# Patient Record
Sex: FEMALE | Race: ASIAN | Employment: FULL TIME | URBAN - METROPOLITAN AREA
[De-identification: names, ages, dates, MRNs, and addresses within clinical notes are randomized per-mention and may not be internally consistent; named-entity substitution may affect disease eponyms.]

---

## 2018-03-18 ENCOUNTER — HOSPITAL ENCOUNTER (OUTPATIENT)
Age: 53
Setting detail: OBSERVATION
Discharge: LEFT BEFORE DISCHARGE | End: 2018-03-19
Attending: EMERGENCY MEDICINE | Admitting: HOSPITALIST
Payer: COMMERCIAL

## 2018-03-18 ENCOUNTER — APPOINTMENT (OUTPATIENT)
Dept: CT IMAGING | Age: 53
End: 2018-03-18
Attending: EMERGENCY MEDICINE
Payer: COMMERCIAL

## 2018-03-18 ENCOUNTER — APPOINTMENT (OUTPATIENT)
Dept: GENERAL RADIOLOGY | Age: 53
End: 2018-03-18
Attending: EMERGENCY MEDICINE
Payer: COMMERCIAL

## 2018-03-18 DIAGNOSIS — F41.1 ANXIETY STATE: ICD-10-CM

## 2018-03-18 DIAGNOSIS — R06.02 SOB (SHORTNESS OF BREATH): Primary | ICD-10-CM

## 2018-03-18 PROBLEM — F06.8: Status: ACTIVE | Noted: 2018-03-18

## 2018-03-18 PROBLEM — R22.1 NECK MASS: Status: ACTIVE | Noted: 2018-03-18

## 2018-03-18 PROBLEM — G93.40 ENCEPHALOPATHY ACUTE: Status: ACTIVE | Noted: 2018-03-18

## 2018-03-18 PROBLEM — F41.9 ANXIETY: Status: ACTIVE | Noted: 2018-03-18

## 2018-03-18 PROBLEM — E87.6 HYPOKALEMIA: Status: ACTIVE | Noted: 2018-03-18

## 2018-03-18 LAB
ALBUMIN SERPL-MCNC: 4.3 G/DL (ref 3.5–5)
ALBUMIN/GLOB SERPL: 1.2 {RATIO} (ref 1.2–3.5)
ALP SERPL-CCNC: 61 U/L (ref 50–136)
ALT SERPL-CCNC: 22 U/L (ref 12–65)
ANION GAP SERPL CALC-SCNC: 15 MMOL/L (ref 7–16)
AST SERPL-CCNC: 20 U/L (ref 15–37)
BASOPHILS # BLD: 0 K/UL (ref 0–0.2)
BASOPHILS NFR BLD: 0 % (ref 0–2)
BILIRUB SERPL-MCNC: 1 MG/DL (ref 0.2–1.1)
BUN SERPL-MCNC: 5 MG/DL (ref 6–23)
CALCIUM SERPL-MCNC: 8.9 MG/DL (ref 8.3–10.4)
CHLORIDE SERPL-SCNC: 102 MMOL/L (ref 98–107)
CO2 SERPL-SCNC: 24 MMOL/L (ref 21–32)
CREAT SERPL-MCNC: 0.73 MG/DL (ref 0.6–1)
D DIMER PPP FEU-MCNC: 0.73 UG/ML(FEU)
DIFFERENTIAL METHOD BLD: ABNORMAL
EOSINOPHIL # BLD: 0 K/UL (ref 0–0.8)
EOSINOPHIL NFR BLD: 1 % (ref 0.5–7.8)
ERYTHROCYTE [DISTWIDTH] IN BLOOD BY AUTOMATED COUNT: 13.1 % (ref 11.9–14.6)
GLOBULIN SER CALC-MCNC: 3.7 G/DL (ref 2.3–3.5)
GLUCOSE SERPL-MCNC: 126 MG/DL (ref 65–100)
HCT VFR BLD AUTO: 41.9 % (ref 35.8–46.3)
HGB BLD-MCNC: 13.7 G/DL (ref 11.7–15.4)
IMM GRANULOCYTES # BLD: 0 K/UL (ref 0–0.5)
IMM GRANULOCYTES NFR BLD AUTO: 0 % (ref 0–5)
LYMPHOCYTES # BLD: 2.3 K/UL (ref 0.5–4.6)
LYMPHOCYTES NFR BLD: 26 % (ref 13–44)
MCH RBC QN AUTO: 27.1 PG (ref 26.1–32.9)
MCHC RBC AUTO-ENTMCNC: 32.7 G/DL (ref 31.4–35)
MCV RBC AUTO: 83 FL (ref 79.6–97.8)
MONOCYTES # BLD: 0.6 K/UL (ref 0.1–1.3)
MONOCYTES NFR BLD: 7 % (ref 4–12)
NEUTS SEG # BLD: 5.7 K/UL (ref 1.7–8.2)
NEUTS SEG NFR BLD: 66 % (ref 43–78)
PLATELET # BLD AUTO: 324 K/UL (ref 150–450)
PMV BLD AUTO: 10.1 FL (ref 10.8–14.1)
POTASSIUM SERPL-SCNC: 2.9 MMOL/L (ref 3.5–5.1)
PROT SERPL-MCNC: 8 G/DL (ref 6.3–8.2)
RBC # BLD AUTO: 5.05 M/UL (ref 4.05–5.25)
SODIUM SERPL-SCNC: 141 MMOL/L (ref 136–145)
TROPONIN I SERPL-MCNC: <0.04 NG/ML (ref 0.02–0.05)
TSH SERPL DL<=0.005 MIU/L-ACNC: 0.35 UIU/ML (ref 0.36–3.74)
WBC # BLD AUTO: 8.6 K/UL (ref 4.3–11.1)

## 2018-03-18 PROCEDURE — 71046 X-RAY EXAM CHEST 2 VIEWS: CPT

## 2018-03-18 PROCEDURE — 74011636320 HC RX REV CODE- 636/320: Performed by: EMERGENCY MEDICINE

## 2018-03-18 PROCEDURE — 99218 HC RM OBSERVATION: CPT

## 2018-03-18 PROCEDURE — 84484 ASSAY OF TROPONIN QUANT: CPT | Performed by: EMERGENCY MEDICINE

## 2018-03-18 PROCEDURE — 84439 ASSAY OF FREE THYROXINE: CPT | Performed by: EMERGENCY MEDICINE

## 2018-03-18 PROCEDURE — 74011250637 HC RX REV CODE- 250/637: Performed by: EMERGENCY MEDICINE

## 2018-03-18 PROCEDURE — 84443 ASSAY THYROID STIM HORMONE: CPT | Performed by: EMERGENCY MEDICINE

## 2018-03-18 PROCEDURE — 93005 ELECTROCARDIOGRAM TRACING: CPT | Performed by: EMERGENCY MEDICINE

## 2018-03-18 PROCEDURE — 70491 CT SOFT TISSUE NECK W/DYE: CPT

## 2018-03-18 PROCEDURE — 71260 CT THORAX DX C+: CPT

## 2018-03-18 PROCEDURE — 85379 FIBRIN DEGRADATION QUANT: CPT | Performed by: EMERGENCY MEDICINE

## 2018-03-18 PROCEDURE — 85025 COMPLETE CBC W/AUTO DIFF WBC: CPT | Performed by: EMERGENCY MEDICINE

## 2018-03-18 PROCEDURE — 99285 EMERGENCY DEPT VISIT HI MDM: CPT | Performed by: EMERGENCY MEDICINE

## 2018-03-18 PROCEDURE — 80053 COMPREHEN METABOLIC PANEL: CPT | Performed by: EMERGENCY MEDICINE

## 2018-03-18 PROCEDURE — 74011000258 HC RX REV CODE- 258: Performed by: EMERGENCY MEDICINE

## 2018-03-18 RX ORDER — POTASSIUM CHLORIDE 20 MEQ/1
40 TABLET, EXTENDED RELEASE ORAL
Status: COMPLETED | OUTPATIENT
Start: 2018-03-18 | End: 2018-03-18

## 2018-03-18 RX ORDER — SODIUM CHLORIDE 0.9 % (FLUSH) 0.9 %
10 SYRINGE (ML) INJECTION
Status: COMPLETED | OUTPATIENT
Start: 2018-03-18 | End: 2018-03-18

## 2018-03-18 RX ADMIN — SODIUM CHLORIDE 100 ML: 900 INJECTION, SOLUTION INTRAVENOUS at 20:23

## 2018-03-18 RX ADMIN — IOPAMIDOL 100 ML: 755 INJECTION, SOLUTION INTRAVENOUS at 20:23

## 2018-03-18 RX ADMIN — Medication 10 ML: at 20:23

## 2018-03-18 RX ADMIN — POTASSIUM CHLORIDE 40 MEQ: 20 TABLET, EXTENDED RELEASE ORAL at 18:34

## 2018-03-18 NOTE — ED NOTES
Pt states following swallowing KDur and orange juice, the feeling of something being stuck in her throat has disappeared.

## 2018-03-18 NOTE — ED TRIAGE NOTES
Patient at this time has now calmed down and states that she is feeling better than when she initially arrived. Patient denies any shortness of breath or pain at this time. Patient's only complaint at this time is \"feels like something in my throat\" for a few days however has not had difficulty eating or drinking.

## 2018-03-18 NOTE — ED PROVIDER NOTES
HPI Comments: Patient is a 49 yo female who presents with \"I felt like I was going to die\". States she has had a feeling of something in her throat for the past month however today became extremely anxious and felt like she was going to die. She denies any complaints at this time other than feeling in her throat, however denies chest pain, no SOB, no abdominal pain, no nausea or vomiting, no further concerns. While in CT (3 hours into ED stay)  pulls me aside and states she has been having severe hallucinations/delusions that people are chasing her and that there is a transmitter in her neck and she has been very difficult to control at times due to severe anxiety about these thoughts. Patient is a 50 y.o. female presenting with hyperventilation and shortness of breath. The history is provided by the patient. No  was used. Hyperventilating   Pertinent negatives include no fever, no headaches, no rhinorrhea, no sore throat, no neck pain, no cough, no chest pain, no vomiting, no abdominal pain, no rash and no leg swelling. Shortness of Breath   Pertinent negatives include no fever, no headaches, no rhinorrhea, no sore throat, no neck pain, no cough, no chest pain, no vomiting, no abdominal pain, no rash and no leg swelling.         Past Medical History:   Diagnosis Date    Calculus of kidney     hx    Endometrial carcinoma ([facility] [address] 12/11/14    hysterectomy     Endometriosis 12/8/2014    Fibrocystic breast     GERD (gastroesophageal reflux disease)     Nausea & vomiting     Unspecified adverse effect of anesthesia     delayed awaking       Past Surgical History:   Procedure Laterality Date    HX ACL RECONSTRUCTION Left 2004    HX APPENDECTOMY      HX HYSTERECTOMY      HX LITHOTRIPSY      HX OTHER SURGICAL      parathyroid    HX PARATHYROIDECTOMY  10/2013         Family History:   Problem Relation Age of Onset    Diabetes Mother     High Cholesterol Mother     Asthma Father     Breast Cancer Neg Hx        Social History     Social History    Marital status:      Spouse name: N/A    Number of children: N/A    Years of education: N/A     Occupational History    Not on file. Social History Main Topics    Smoking status: Never Smoker    Smokeless tobacco: Never Used    Alcohol use Yes      Comment: occas    Drug use: No    Sexual activity: Yes     Partners: Male     Birth control/ protection: Condom     Other Topics Concern    Not on file     Social History Narrative         ALLERGIES: Review of patient's allergies indicates no known allergies. Review of Systems   Constitutional: Negative for chills and fever. HENT: Positive for trouble swallowing. Negative for rhinorrhea and sore throat. Eyes: Negative for visual disturbance. Respiratory: Positive for shortness of breath. Negative for cough. Cardiovascular: Negative for chest pain and leg swelling. Gastrointestinal: Negative for abdominal pain, diarrhea, nausea and vomiting. Genitourinary: Negative for dysuria. Musculoskeletal: Negative for back pain and neck pain. Skin: Negative for rash. Neurological: Negative for weakness and headaches. Psychiatric/Behavioral: The patient is nervous/anxious. Vitals:    03/18/18 1721   Pulse: (!) 104   Resp: 20   Temp: 98.5 °F (36.9 °C)   SpO2: 97%   Weight: 45.4 kg (100 lb)   Height: 5' (1.524 m)            Physical Exam   Constitutional: She is oriented to person, place, and time. She appears well-developed and well-nourished. HENT:   Head: Normocephalic. Right Ear: External ear normal.   Left Ear: External ear normal.   Eyes: Conjunctivae and EOM are normal. Pupils are equal, round, and reactive to light. Neck: Normal range of motion. Neck supple. No tracheal deviation present. Cardiovascular: Normal rate, regular rhythm, normal heart sounds and intact distal pulses. No murmur heard.   Pulmonary/Chest: Effort normal and breath sounds normal. No respiratory distress. Abdominal: Soft. There is no tenderness. Musculoskeletal: Normal range of motion. Neurological: She is alert and oriented to person, place, and time. No cranial nerve deficit. Cn 2-12 fully intact, strength and sensation 5/5 in all extremities, negative pronator drift, ambulates without difficulty, no focal deficits appreciated. Skin: No rash noted. Nursing note and vitals reviewed. MDM  Number of Diagnoses or Management Options  Anxiety state: new and requires workup  SOB (shortness of breath): new and requires workup     Amount and/or Complexity of Data Reviewed  Clinical lab tests: ordered and reviewed  Tests in the radiology section of CPT®: ordered and reviewed  Tests in the medicine section of CPT®: ordered and reviewed  Review and summarize past medical records: yes    Risk of Complications, Morbidity, and/or Mortality  Presenting problems: high  Diagnostic procedures: high  Management options: high    Patient Progress  Patient progress: stable        ED Course       Procedures    Recent Results (from the past 12 hour(s))   CBC WITH AUTOMATED DIFF    Collection Time: 03/18/18  5:40 PM   Result Value Ref Range    WBC 8.6 4.3 - 11.1 K/uL    RBC 5.05 4.05 - 5.25 M/uL    HGB 13.7 11.7 - 15.4 g/dL    HCT 41.9 35.8 - 46.3 %    MCV 83.0 79.6 - 97.8 FL    MCH 27.1 26.1 - 32.9 PG    MCHC 32.7 31.4 - 35.0 g/dL    RDW 13.1 11.9 - 14.6 %    PLATELET 410 070 - 443 K/uL    MPV 10.1 (L) 10.8 - 14.1 FL    DF AUTOMATED      NEUTROPHILS 66 43 - 78 %    LYMPHOCYTES 26 13 - 44 %    MONOCYTES 7 4.0 - 12.0 %    EOSINOPHILS 1 0.5 - 7.8 %    BASOPHILS 0 0.0 - 2.0 %    IMMATURE GRANULOCYTES 0 0.0 - 5.0 %    ABS. NEUTROPHILS 5.7 1.7 - 8.2 K/UL    ABS. LYMPHOCYTES 2.3 0.5 - 4.6 K/UL    ABS. MONOCYTES 0.6 0.1 - 1.3 K/UL    ABS. EOSINOPHILS 0.0 0.0 - 0.8 K/UL    ABS. BASOPHILS 0.0 0.0 - 0.2 K/UL    ABS. IMM.  GRANS. 0.0 0.0 - 0.5 K/UL   METABOLIC PANEL, COMPREHENSIVE Collection Time: 03/18/18  5:40 PM   Result Value Ref Range    Sodium 141 136 - 145 mmol/L    Potassium 2.9 (LL) 3.5 - 5.1 mmol/L    Chloride 102 98 - 107 mmol/L    CO2 24 21 - 32 mmol/L    Anion gap 15 7 - 16 mmol/L    Glucose 126 (H) 65 - 100 mg/dL    BUN 5 (L) 6 - 23 MG/DL    Creatinine 0.73 0.6 - 1.0 MG/DL    GFR est AA >60 >60 ml/min/1.73m2    GFR est non-AA >60 >60 ml/min/1.73m2    Calcium 8.9 8.3 - 10.4 MG/DL    Bilirubin, total 1.0 0.2 - 1.1 MG/DL    ALT (SGPT) 22 12 - 65 U/L    AST (SGOT) 20 15 - 37 U/L    Alk. phosphatase 61 50 - 136 U/L    Protein, total 8.0 6.3 - 8.2 g/dL    Albumin 4.3 3.5 - 5.0 g/dL    Globulin 3.7 (H) 2.3 - 3.5 g/dL    A-G Ratio 1.2 1.2 - 3.5     D DIMER    Collection Time: 03/18/18  5:40 PM   Result Value Ref Range    D DIMER 0.73 (HH) <0.56 ug/ml(FEU)   TROPONIN I    Collection Time: 03/18/18  5:40 PM   Result Value Ref Range    Troponin-I, Qt. <0.04 0.02 - 0.05 NG/ML   TSH 3RD GENERATION    Collection Time: 03/18/18  5:40 PM   Result Value Ref Range    TSH 0.355 (L) 0.358 - 3.740 uIU/mL          49 yo female with mass in throat and hallucinations/delusions:    Discussed CT with Dr. Simeon Pool who will scope patient for further evaluation either as outpatient or inpatient. Patient needs imaging of brain for these hallucinations to rule out mass vs. Further acute etiology however received contrast for CT neck so unable to perform at this time and feel MRI likely more helpful. Given entire clinical picture will admit for MRI and endoscopy for further workup.

## 2018-03-18 NOTE — ED NOTES
Lab called and said D-Dimer for this pt would be a while as they are having trouble with their machine.

## 2018-03-18 NOTE — ED NOTES
Patient arrived in ED with  at this time hyperventilating and screaming that she is going to die and that he needs to take care of her children. Patient noted with respiratory rate around 50-60 and would not talked to staff however would then start yelling again at . Patient was then taken and had 12 lead EKG performed and with verbal encouragement has slowed down respiratory rate and talking calmly with this RN. Patient complaining of shortness of breath and \"my body is spreading\". Patient moved back into main area of ER and advised her  could only be with her if she remained calmed, kept her breathing controlled and did not yell at him within ED.

## 2018-03-19 VITALS
TEMPERATURE: 97.6 F | HEART RATE: 84 BPM | RESPIRATION RATE: 20 BRPM | HEIGHT: 60 IN | OXYGEN SATURATION: 96 % | SYSTOLIC BLOOD PRESSURE: 134 MMHG | DIASTOLIC BLOOD PRESSURE: 81 MMHG | BODY MASS INDEX: 19.63 KG/M2 | WEIGHT: 100 LBS

## 2018-03-19 LAB
AMPHET UR QL SCN: NEGATIVE
ANION GAP SERPL CALC-SCNC: 17 MMOL/L (ref 7–16)
ATRIAL RATE: 109 BPM
BARBITURATES UR QL SCN: NEGATIVE
BENZODIAZ UR QL: NEGATIVE
BUN SERPL-MCNC: 6 MG/DL (ref 6–23)
CALCIUM SERPL-MCNC: 8.8 MG/DL (ref 8.3–10.4)
CALCIUM SERPL-MCNC: 8.8 MG/DL (ref 8.3–10.4)
CALCULATED P AXIS, ECG09: 68 DEGREES
CALCULATED R AXIS, ECG10: 65 DEGREES
CALCULATED T AXIS, ECG11: 45 DEGREES
CANNABINOIDS UR QL SCN: NEGATIVE
CHLORIDE SERPL-SCNC: 101 MMOL/L (ref 98–107)
CO2 SERPL-SCNC: 23 MMOL/L (ref 21–32)
COCAINE UR QL SCN: NEGATIVE
CREAT SERPL-MCNC: 0.66 MG/DL (ref 0.6–1)
DIAGNOSIS, 93000: NORMAL
FOLATE SERPL-MCNC: 19.9 NG/ML (ref 3.1–17.5)
GLUCOSE SERPL-MCNC: 148 MG/DL (ref 65–100)
MAGNESIUM SERPL-MCNC: 2.2 MG/DL (ref 1.8–2.4)
METHADONE UR QL: NEGATIVE
OPIATES UR QL: NEGATIVE
P-R INTERVAL, ECG05: 120 MS
PCP UR QL: NEGATIVE
PHOSPHATE SERPL-MCNC: 3.2 MG/DL (ref 2.5–4.5)
POTASSIUM SERPL-SCNC: 3.8 MMOL/L (ref 3.5–5.1)
PTH-INTACT SERPL-MCNC: 97.7 PG/ML (ref 18.5–88)
Q-T INTERVAL, ECG07: 490 MS
QRS DURATION, ECG06: 80 MS
QTC CALCULATION (BEZET), ECG08: 659 MS
SODIUM SERPL-SCNC: 141 MMOL/L (ref 136–145)
T4 FREE SERPL-MCNC: 1.4 NG/DL (ref 0.78–1.46)
VENTRICULAR RATE, ECG03: 109 BPM
VIT B12 SERPL-MCNC: 331 PG/ML (ref 193–986)

## 2018-03-19 PROCEDURE — 80307 DRUG TEST PRSMV CHEM ANLYZR: CPT | Performed by: HOSPITALIST

## 2018-03-19 PROCEDURE — 96360 HYDRATION IV INFUSION INIT: CPT

## 2018-03-19 PROCEDURE — 82607 VITAMIN B-12: CPT | Performed by: HOSPITALIST

## 2018-03-19 PROCEDURE — 84100 ASSAY OF PHOSPHORUS: CPT | Performed by: HOSPITALIST

## 2018-03-19 PROCEDURE — 83970 ASSAY OF PARATHORMONE: CPT | Performed by: HOSPITALIST

## 2018-03-19 PROCEDURE — 74011250636 HC RX REV CODE- 250/636: Performed by: HOSPITALIST

## 2018-03-19 PROCEDURE — 80048 BASIC METABOLIC PNL TOTAL CA: CPT | Performed by: HOSPITALIST

## 2018-03-19 PROCEDURE — 83735 ASSAY OF MAGNESIUM: CPT | Performed by: HOSPITALIST

## 2018-03-19 PROCEDURE — 96361 HYDRATE IV INFUSION ADD-ON: CPT

## 2018-03-19 PROCEDURE — 99218 HC RM OBSERVATION: CPT

## 2018-03-19 PROCEDURE — 82746 ASSAY OF FOLIC ACID SERUM: CPT | Performed by: HOSPITALIST

## 2018-03-19 RX ORDER — SODIUM CHLORIDE 0.9 % (FLUSH) 0.9 %
5-10 SYRINGE (ML) INJECTION AS NEEDED
Status: DISCONTINUED | OUTPATIENT
Start: 2018-03-19 | End: 2018-03-19 | Stop reason: HOSPADM

## 2018-03-19 RX ORDER — ONDANSETRON 2 MG/ML
4 INJECTION INTRAMUSCULAR; INTRAVENOUS
Status: DISCONTINUED | OUTPATIENT
Start: 2018-03-19 | End: 2018-03-19 | Stop reason: HOSPADM

## 2018-03-19 RX ORDER — LORAZEPAM 2 MG/ML
1 INJECTION INTRAMUSCULAR
Status: DISCONTINUED | OUTPATIENT
Start: 2018-03-18 | End: 2018-03-19 | Stop reason: HOSPADM

## 2018-03-19 RX ORDER — SODIUM CHLORIDE 0.9 % (FLUSH) 0.9 %
5-10 SYRINGE (ML) INJECTION EVERY 8 HOURS
Status: DISCONTINUED | OUTPATIENT
Start: 2018-03-19 | End: 2018-03-19 | Stop reason: HOSPADM

## 2018-03-19 RX ORDER — ACETAMINOPHEN 325 MG/1
650 TABLET ORAL
Status: DISCONTINUED | OUTPATIENT
Start: 2018-03-19 | End: 2018-03-19 | Stop reason: HOSPADM

## 2018-03-19 RX ORDER — SODIUM CHLORIDE 9 MG/ML
125 INJECTION, SOLUTION INTRAVENOUS CONTINUOUS
Status: DISCONTINUED | OUTPATIENT
Start: 2018-03-19 | End: 2018-03-19 | Stop reason: HOSPADM

## 2018-03-19 RX ADMIN — SODIUM CHLORIDE 125 ML/HR: 900 INJECTION, SOLUTION INTRAVENOUS at 00:30

## 2018-03-19 NOTE — DISCHARGE SUMMARY
Hospitalist Discharge Summary     Patient ID:  René Hill  [id number]  50 y.o.  1965  Admit date: 3/18/2018  5:50 PM  Discharge date and time: 3/19/2018  Attending: Izabel Muñoz MD  PCP:  Ger Reddy MD  Treatment Team: Attending Provider: Izabel Muñoz MD    Principal Diagnosis Acute psychosis associated with endocrine, metabolic, or cerebrovascular disorder   Principal Problem:    Acute psychosis associated with endocrine, metabolic, or cerebrovascular disorder (3/18/2018)    Active Problems:    Neck mass (3/18/2018)      Encephalopathy acute (3/18/2018)      Hypokalemia (3/18/2018)      Anxiety (3/18/2018)             Hospital Course:  Please refer to the admission H&P for details of presentation. In summary, the patient is   49 y/o female with no prior medical hx for CNS disease, mental erick disease, substance or alcohol abuse presents to the ED accompanied by her  for anxiety and somatic sensation that she is going to die. Felt a sensation of something in her throat for several days. Was actually screaming in ER area and hyperventilating.  had expressed that his wife has been having paranoia lately. And had described to him that something moving through her body in waves. She is not on any new medications or abruptly discontinued any. No fever, chills, seizures, tremors. Had a RLL parathyroidectomy in [place] in 2013. She is not hypercalcemic tonight. TSH is slightly suppressed at 0.355. A CT neck performed which shows an asymmetric soft tissue swelling of the right tongue base and inferior right palatine tonsilar region. No radiopaque foreign bodies. No fever, WBC ct is normal. Tele-psych was utilized who recommended further medical workup and did not meet any psychiatric criteria for commitment.      When patient was initially admitted in ED she was smiling and jovial-appearing however later on arrival to floor stated her throat was closing up and that she could not breath even though O2 sats normal and vitals stable. At no point did she have signs of stridor, allergic reaction or angioedema. Stated the sensation of right side of her neck had moved to the left side and that the CT moved it. Insisting that she has a transmitter in her neck. Refused ativan to try to relax her. Does not want to have brain MRI today. Just wants the CT dye as believes it will help. States to nurse that the transmitter in her neck was restarted. Refuses IM geodon and any further treatment or workup and wishes to go somewhere else and start all over with the testing    I discussed her leaving AMA with her and her . Nurse supervisor present as well as floor nurse. Explained we do not have a definitive diagnosis at this point what is seen on CT neck and that ENT was to see her and that we are concerned of a CNS process that MRI might elucidate. She still remains adamant to leave AM despite possibility of adverse event, morbidity or mortality. I gave  other nearby names of medical facilities that are on the [facility] system who can access the workup which has been performed thus far. Significant Diagnostic Studies: UDS negative      Labs: Results:       Chemistry Recent Labs      03/19/18   0231  03/18/18   1740   GLU  148*  126*   NA  141  141   K  3.8  2.9*   CL  101  102   CO2  23  24   BUN  6  5*   CREA  0.66  0.73   CA  8.8  8.8  8.9   AGAP  17*  15   AP   --   61   TP   --   8.0   ALB   --   4.3   GLOB   --   3.7*   AGRAT   --   1.2      CBC w/Diff Recent Labs      03/18/18   1740   WBC  8.6   RBC  5.05   HGB  13.7   HCT  41.9   PLT  324   GRANS  66   LYMPH  26   EOS  1      Cardiac Enzymes No results for input(s): CPK, CKND1, HERMILO in the last 72 hours. No lab exists for component: CKRMB, TROIP   Coagulation No results for input(s): PTP, INR, APTT in the last 72 hours.     No lab exists for component: INREXT    Lipid Panel No results found for: CHOL, CHOLPOCT, CHOLX, CHLST, CHOLV, 994267, HDL, LDL, LDLC, DLDLP, 956473, VLDLC, VLDL, TGLX, TRIGL, TRIGP, TGLPOCT, CHHD, CHHDX   BNP No results for input(s): BNPP in the last 72 hours. Liver Enzymes Recent Labs      03/18/18   1740   TP  8.0   ALB  4.3   AP  61   SGOT  20      Thyroid Studies Lab Results   Component Value Date/Time    TSH 0.355 (L) 03/18/2018 05:40 PM            Discharge Exam:  Visit Vitals    /81 (BP 1 Location: Left arm, BP Patient Position: Sitting)    Pulse 84    Temp 97.6 °F (36.4 °C)    Resp 20    Ht 5' (1.524 m)    Wt 45.4 kg (100 lb)    LMP 12/01/2014    SpO2 96%    BMI 19.53 kg/m2     PE: agitated. VSS. No stridor. Paranoid. No focal motor deficits. Disposition: AMA d/c (against medical advice)  Discharge Condition: needs further workup for diagnosis and treatment. Patient having paranoid delusions. Could have a functional CNS etiology. Patient Instructions: seek additional medical evaluation and second opinion asap today  Current Discharge Medication List          Follow-up  · Encouraged her  to have her see other medical provider ASAP and not allow her to drive or go to back to work until further evaluated.   ·     Time spent to discharge patient 35 minutes  Signed:  Hector Gonzales MD  3/19/2018  5:30 AM

## 2018-03-19 NOTE — PROGRESS NOTES
Pt called me to room requestiing a repeat CT. Explainied they already did one and need a better visual and that will be the MRI. She states \"the fanny who brought me up here restarted it and now it has moved. \"  Pt tried to explain to me and I simoply cannot understand her train of thought. Advised to review with rounding MD in the morning for further clarification.   Pa Ryan RN

## 2018-03-19 NOTE — ED NOTES
Pt's spouse met me in the [place] and talked about his wife having severe paranoia over the last few days and thinking he is in on it. When asked if he spoke with th physician about this he stated \"no\". Advised that this is a significant set of symptoms that the physician should have known about hours ago. Physician has now been advised of this information.

## 2018-03-19 NOTE — PROGRESS NOTES
2092 In with patient and patient's  per request of Reed Nuñez. Pt is requesting to leave this hospital and refusing any further treatment. She is oriented to person, place, and time. Multiple attempts to explain plan of care and why patient cannot have additional contrast and testing at present. Unable to reason with patient.  wishes her to stay but she is insistent that she leave. Dr. Jayden Landin came and talked to patient and encouraged her to stay. She refuses. AMA paperwork signed by patient in the presence of this RN, Dr. Rogelio Dow, and her . I.V removed by Reed Nuñez.

## 2018-03-19 NOTE — PROGRESS NOTES
Pt continues to rant loudly  \"the transmitter is moving around like a car and I only need the contrast\".  at bedside and she  Will not rest.  Enc her to try and relax and she can review with MD when he rounds.   René Cobb RN

## 2018-03-19 NOTE — PROGRESS NOTES
Called to room by  about 0430. Pt insistant on leaving. States we won't listen to her as she knows what her body wants. She states she was fine downstairs (ER) and the nurse who brought her to the floor cleared his throat 3 times and the dark feelings came right back.  wants her to stay but she refuses. MD ordered geodon IM and pt refused. Con Bones RN/HS at the bedside and had been talking to her without success. Per Benedicto Purdy, Dr Chris Do called to the room and he also reviewed the POC and the possibility of not following through. Demanded to leave as she needs to go to another facility as this hospital will not do as she tells us. Prepared AMA paperwork and all was explained and she signed herself out. All concerns were reviewed with pt and . IV removed and pt left ambulating. Pt cont being A=O X 3 this entire time, however, pt was unable to rationalize.   Left @ [facility] Ardeth Like

## 2018-03-19 NOTE — PROGRESS NOTES
Pt to room at [phone] with spouse at bedside. Oriented to room and call bell. Appears to be AxO. Spouse left to get pt's things at home and will return. Skin assessment done by Toro Swartz RN and myself and all intact. Urine rec'd and sent to lab. Pt is coughing continuously and states \"My throat is closing up and I feel like I can't breath. Dr Homero Soulier and Haley Salazar RN statyed at bedside with pulse-ox ranging 96-99%. Md updated and reviewed the POC. Requested to give the ativan, however, the pt is adamant to not receive it. Also refuses blood products \"No reason, I don't want them. \"  Also put NS up at 125. Pt in bed, in low position and enc to call with needs.   Suyapa Carter RN

## 2018-03-19 NOTE — PROGRESS NOTES
Pt insisting for the 2nd time in 1 hour that the \"the transmitter needs to come out now. \"  She says she can feel it radiating all over,  Jody Ellis, this hospital will be libel when something happens to her. O2 sats %, pt can talk without a problem and coughing has let up. Bette Gomez RN  [id number]:  Spouse at bedside and she's also telling him the transmitter needs to be taken out-now,  He will try to get her to take the ativan.    Bette Gomez RN

## 2018-03-21 ENCOUNTER — HOSPITAL ENCOUNTER (OUTPATIENT)
Dept: MRI IMAGING | Age: 53
Discharge: HOME | End: 2018-03-21
Attending: INTERNAL MEDICINE
Payer: COMMERCIAL

## 2018-03-21 DIAGNOSIS — Q38.3 TONGUE ABNORMALITY: ICD-10-CM

## 2018-03-21 DIAGNOSIS — R41.82 ALTERED MENTAL STATUS: ICD-10-CM

## 2018-03-21 PROCEDURE — 74011250636 HC RX REV CODE- 250/636

## 2018-03-21 PROCEDURE — A9577 INJ MULTIHANCE: HCPCS

## 2018-03-21 PROCEDURE — 70553 MRI BRAIN STEM W/O & W/DYE: CPT

## 2018-03-21 RX ORDER — SODIUM CHLORIDE 0.9 % (FLUSH) 0.9 %
10 SYRINGE (ML) INJECTION
Status: COMPLETED | OUTPATIENT
Start: 2018-03-21 | End: 2018-03-21

## 2018-03-21 RX ADMIN — GADOBENATE DIMEGLUMINE 10 ML: 529 INJECTION, SOLUTION INTRAVENOUS at 15:00

## 2018-03-21 RX ADMIN — Medication 10 ML: at 15:00

## 2018-12-14 ENCOUNTER — HOSPITAL ENCOUNTER (OUTPATIENT)
Dept: MAMMOGRAPHY | Age: 53
Discharge: HOME | End: 2018-12-14
Attending: OBSTETRICS & GYNECOLOGY
Payer: COMMERCIAL

## 2018-12-14 DIAGNOSIS — Z12.39 SPECIAL SCREENING EXAMINATION FOR NEOPLASM OF BREAST: ICD-10-CM

## 2018-12-14 DIAGNOSIS — Z13.820 ENCOUNTER FOR SCREENING FOR OSTEOPOROSIS: ICD-10-CM

## 2018-12-14 PROCEDURE — 77063 BREAST TOMOSYNTHESIS BI: CPT

## 2018-12-14 PROCEDURE — 77080 DXA BONE DENSITY AXIAL: CPT

## 2018-12-28 ENCOUNTER — HOSPITAL ENCOUNTER (OUTPATIENT)
Dept: MAMMOGRAPHY | Age: 53
Discharge: HOME | End: 2018-12-28
Attending: OBSTETRICS & GYNECOLOGY
Payer: COMMERCIAL

## 2018-12-28 DIAGNOSIS — R92.8 ABNORMAL SCREENING MAMMOGRAM: ICD-10-CM

## 2018-12-28 PROCEDURE — 77065 DX MAMMO INCL CAD UNI: CPT

## 2019-08-15 NOTE — ED NOTES
TRANSFER - OUT REPORT:    Verbal report given to Maggie (name) on Gianfranco Guadalupe  being transferred to [phone] (unit) for routine progression of care       Report consisted of patients Situation, Background, Assessment and   Recommendations(SBAR). Information from the following report(s) ED Summary was reviewed with the receiving nurse. Lines:   Peripheral IV 03/18/18 Right Antecubital (Active)   Site Assessment Clean, dry, & intact 3/18/2018  5:44 PM   Phlebitis Assessment 0 3/18/2018  5:44 PM   Infiltration Assessment 0 3/18/2018  5:44 PM        Opportunity for questions and clarification was provided.       Patient transported with:   Viki 15-Aug-2019 11:20

## 2020-02-26 ENCOUNTER — HOSPITAL ENCOUNTER (OUTPATIENT)
Dept: LAB | Age: 55
Discharge: HOME | End: 2020-02-26

## 2020-02-26 PROCEDURE — 88305 TISSUE EXAM BY PATHOLOGIST: CPT

## 2021-12-17 NOTE — H&P
Hospitalist H&P/Consult Note     Admit Date:  3/18/2018  5:50 PM   Name:  Lalo Prado   Age:  50 y.o.  :  1965   MRN:  [id number]   PCP:  Jone Mccarthy MD  Treatment Team: Attending Provider: Ramos Ramos MD    HPI:   49 y/o female with no prior medical hx for CNS disease, mental erick disease, substance or alcohol abuse presents to the ED accompanied by her  for anxiety and somatic sensation that she is going to die. Felt a sensation of something in her throat for several days. Was actually screaming in ER area and hyperventilating.  had expressed that his wife has been having paranoia lately. And had described to him that something moving through her body in waves. She is not on any new medications or abruptly discontinued any. No fever, chills, seizures, tremors. Had a RLL parathyroidectomy in [place] in . She is not hypercalcemic tonight. TSH is slightly suppressed at 0.355. A CT neck performed which shows an asymmetric soft tissue swelling of the right tongue base and inferior right palatine tonsilar region. No radiopaque foreign bodies. No fever, WBC ct is normal. Tele-psych was utilized who recommended further medical workup and did not meet any psychiatric criteria for commitment. 10 systems reviewed and negative except as noted in HPI. Past Medical History:   Diagnosis Date    Calculus of kidney     hx    Endometrial carcinoma ([facility] [address] 14    hysterectomy     Endometriosis 2014    Fibrocystic breast     GERD (gastroesophageal reflux disease)     Nausea & vomiting     Unspecified adverse effect of anesthesia     delayed awaking      Past Surgical History:   Procedure Laterality Date    HX ACL RECONSTRUCTION Left     HX APPENDECTOMY      HX HYSTERECTOMY      HX LITHOTRIPSY      HX OTHER SURGICAL      parathyroid    HX PARATHYROIDECTOMY  10/2013      Prior to Admission Medications   Prescriptions Last Dose Informant Patient Reported? Taking? [Chaperone Present] : A chaperone was present in the examining room during all aspects of the physical examination clobetasol (TEMOVATE) 0.05 % ointment   No No   Sig: Apply  to affected area two (2) times a day. estradiol (ESTRACE) 0.01 % (0.1 mg/gram) vaginal cream   No No   Sig: Use 2 grams on MWF nights   multivitamin (ONE A DAY) tablet   Yes No   Sig: Take 1 tablet by mouth daily. Per anesthesia protocol: pt instructed to stop med 7 days prior to day of surgery. potassium citrate (UROCIT-K10) 10 mEq TbER   Yes No   Sig: Take 20 mEq by mouth two (2) times a day. Per anesthesia protocol:instructed to take am of surgery. Indications: CALCIUM RENAL CALCULI PREVENTION   vitamin c-vitamin e (CRANBERRY CONCENTRATE) cap   Yes No   Sig: Take  by mouth. Facility-Administered Medications: None     No Known Allergies   Social History   Substance Use Topics    Smoking status: Never Smoker    Smokeless tobacco: Never Used    Alcohol use Yes      Comment: occas      Family History   Problem Relation Age of Onset    Diabetes Mother     High Cholesterol Mother     Asthma Father     Breast Cancer Neg Hx         There is no immunization history on file for this patient. Objective:   Patient Vitals for the past 24 hrs:   Temp Pulse Resp BP SpO2   03/18/18 2338 - 88 16 147/87 99 %   03/18/18 2300 - - - 137/79 98 %   03/18/18 2230 - - - 151/89 99 %   03/18/18 2200 - - - (!) 164/93 100 %   03/18/18 2130 - - - (!) 159/95 99 %   03/18/18 2100 - - - 153/90 99 %   03/18/18 2045 - - - (!) 154/91 100 %   03/18/18 1826 - - - - 100 %   03/18/18 1721 98.5 °F (36.9 °C) (!) 104 20 - 97 %     Oxygen Therapy  O2 Sat (%): 99 % (03/18/18 2338)  Pulse via Oximetry: 91 beats per minute (03/18/18 2300)  O2 Device: Room air (03/18/18 2338)  No intake or output data in the 24 hours ending 03/19/18 0037    Physical Exam:  General:    Well nourished. Alert. Eyes:   Normal sclera. Extraocular movements intact. ENT:  Normocephalic, atraumatic. Moist mucous membranes  Neck without stridor or tenderness to palpation  CV:   RRR.   No murmur, [Appropriately responsive] : appropriately responsive rub, or gallop. Lungs:  CTAB. No wheezing, rhonchi, or rales. Abdomen: Soft, nontender, nondistended. Bowel sounds normal.   Extremities: Warm and dry. No cyanosis or edema. Neurologic: CN II-XII grossly intact. Sensation intact. No obvious neurologic deficits, tremors  Skin:     No rashes or jaundice. No wounds. Psych:  Smiling and responding jovially to basic questions    I reviewed the labs, imaging, EKGs, telemetry, and other studies done this admission. Data Review:   Recent Results (from the past 24 hour(s))   CBC WITH AUTOMATED DIFF    Collection Time: 03/18/18  5:40 PM   Result Value Ref Range    WBC 8.6 4.3 - 11.1 K/uL    RBC 5.05 4.05 - 5.25 M/uL    HGB 13.7 11.7 - 15.4 g/dL    HCT 41.9 35.8 - 46.3 %    MCV 83.0 79.6 - 97.8 FL    MCH 27.1 26.1 - 32.9 PG    MCHC 32.7 31.4 - 35.0 g/dL    RDW 13.1 11.9 - 14.6 %    PLATELET 170 781 - 490 K/uL    MPV 10.1 (L) 10.8 - 14.1 FL    DF AUTOMATED      NEUTROPHILS 66 43 - 78 %    LYMPHOCYTES 26 13 - 44 %    MONOCYTES 7 4.0 - 12.0 %    EOSINOPHILS 1 0.5 - 7.8 %    BASOPHILS 0 0.0 - 2.0 %    IMMATURE GRANULOCYTES 0 0.0 - 5.0 %    ABS. NEUTROPHILS 5.7 1.7 - 8.2 K/UL    ABS. LYMPHOCYTES 2.3 0.5 - 4.6 K/UL    ABS. MONOCYTES 0.6 0.1 - 1.3 K/UL    ABS. EOSINOPHILS 0.0 0.0 - 0.8 K/UL    ABS. BASOPHILS 0.0 0.0 - 0.2 K/UL    ABS. IMM. GRANS. 0.0 0.0 - 0.5 K/UL   METABOLIC PANEL, COMPREHENSIVE    Collection Time: 03/18/18  5:40 PM   Result Value Ref Range    Sodium 141 136 - 145 mmol/L    Potassium 2.9 (LL) 3.5 - 5.1 mmol/L    Chloride 102 98 - 107 mmol/L    CO2 24 21 - 32 mmol/L    Anion gap 15 7 - 16 mmol/L    Glucose 126 (H) 65 - 100 mg/dL    BUN 5 (L) 6 - 23 MG/DL    Creatinine 0.73 0.6 - 1.0 MG/DL    GFR est AA >60 >60 ml/min/1.73m2    GFR est non-AA >60 >60 ml/min/1.73m2    Calcium 8.9 8.3 - 10.4 MG/DL    Bilirubin, total 1.0 0.2 - 1.1 MG/DL    ALT (SGPT) 22 12 - 65 U/L    AST (SGOT) 20 15 - 37 U/L    Alk.  phosphatase 61 50 - 136 U/L    Protein, total 8.0 6.3 - 8.2 g/dL    Albumin 4.3 3.5 - 5.0 g/dL    Globulin 3.7 (H) 2.3 - 3.5 g/dL    A-G Ratio 1.2 1.2 - 3.5     D DIMER    Collection Time: 03/18/18  5:40 PM   Result Value Ref Range    D DIMER 0.73 (HH) <0.56 ug/ml(FEU)   TROPONIN I    Collection Time: 03/18/18  5:40 PM   Result Value Ref Range    Troponin-I, Qt. <0.04 0.02 - 0.05 NG/ML   TSH 3RD GENERATION    Collection Time: 03/18/18  5:40 PM   Result Value Ref Range    TSH 0.355 (L) 0.358 - 3.740 uIU/mL       Imaging Studies:  CXR Results  (Last 48 hours)               03/18/18 1748  XR CHEST PA LAT Final result    Impression:  Impression:  Normal chest radiograph. Narrative:  PA and lateral chest radiographs       History: short of breath, 52 years Female Severe sob earlier today   No cp       Comparison: None available       Findings:  Normal cardiomediastinal silhouette. Minimal dependent subsegmental   atelectasis bilateral lung bases. No evidence of pneumothorax, pleural   effusion, or air space opacity. Visualized soft tissue and osseous structures   otherwise unremarkable. CT Results  (Last 48 hours)               03/18/18 2031  CT CHEST W CONT Final result    Impression:  IMPRESSION:       No evidence of pulmonary embolism. Date of Dictation: 3/18/2018 8:44 PM       Narrative:  CTA OF THE CHEST - PE STUDY       HISTORY: Chest pain and shortness of breath       COMPARISON: None       TECHNIQUE: A helical acquisition was performed through the chest utilizing   5.56IU slice thickness during the infusion of 100 cc of Isovue-370. 3-D   post-processed images were created on an independent workstation. Multiplanar   reformats were obtained. The exam was focused on the pulmonary arteries. Dose reduction techniques used: Automated exposure control, adjustment of the   mAs and/or kVp according to patient's size, and iterative reconstruction   techniques. FINDINGS:   *  PULMONARY VESSELS: No evidence of pulmonary embolism. [Alert] : alert *  PLEURA / PERICARDIUM: Within normal limits. *  ADAM / MEDIASTINUM: Within normal limits. *  LUNGS: Within normal limits. *  TRACHEOBRONCHIAL TREE: Within normal limits. *  AORTA: Within normal limits. *  CORONARY ARTERIES: No coronary artery calcification is seen. *  CHEST WALL/AXILLA: Within normal limits. *  VISUALIZED UPPER ABDOMEN: Within normal limits. *  SPINE / BONES: Within normal limits. *  ADDITIONAL COMMENTS: None. 03/18/18 2031  CT NECK SOFT TISSUE W CONT Final result    Impression:  IMPRESSION:       Asymmetric soft tissue swelling of the right tongue base and inferior right   palatine tonsillar region. No evidence of radiopaque foreign bodies. Date of Dictation: 3/18/2018 8:50 PM           Narrative:  CT NECK WITH CONTRAST       HISTORY: Swallowed foreign body       COMPARISON:  None       TECHNIQUE: Helical imaging was performed using 2.5mm axial sections during the   intravenous infusion of 100 cc of Isovue-370. Coronal and sagittal   reconstructions were generated. Dose reduction techniques used: Automated exposure control, adjustment of the   mAs and/or kVp according to patient's size, and iterative reconstruction   techniques. FINDINGS:   *  SKULL BASE: Unremarkable. *  PAROTID GLANDS: Unremarkable. *  SUBMANDIBULAR GLANDS: Unremarkable   *  NASOPHARYNX: Unremarkable. *  PARAPHARYNGEAL SPACE: Unremarkable. *  HYPOPHARYNX AND LARYNX: Asymmetric soft tissue swelling right tongue base and   inferior right palatine tonsillar region. *  CAROTID SPACE: Unremarkable. *  LYMPH NODES: Unremarkable. *  THYROID GLAND: Unremarkable. *  LUNG APICES: Unremarkable.                  Assessment and Plan:     Hospital Problems as of 3/19/2018  Date Reviewed: 12/31/2014          Codes Class Noted - Resolved POA    * (Principal)Acute psychosis associated with endocrine, metabolic, or cerebrovascular disorder ICD-10-CM: F06.8, F01.50  ICD-9-CM: 294.9, [No Acute Distress] : no acute distress 293.0  3/18/2018 - Present Yes        Neck mass ICD-10-CM: R22.1  ICD-9-CM: 784.2  3/18/2018 - Present Yes        Encephalopathy acute ICD-10-CM: G93.40  ICD-9-CM: 348.30  3/18/2018 - Present Yes        Hypokalemia ICD-10-CM: E87.6  ICD-9-CM: 276.8  3/18/2018 - Present Yes        Anxiety ICD-10-CM: F41.9  ICD-9-CM: 300.00  3/18/2018 - Present Yes              PLAN:  · Admit as observation to medical bed  · Frequent neurologic evaluations tonight  · ENT consultation in am for opinion and recommendations on asymmetric soft tissue swelling right tongue base and inferior right palatine tonsillar region. · Replace potassium. Check Mg. Check free T4 level.  She is not hypercalcemic to suggest recurrent hyperparathyroidism but check iPTH level  · MRI brain with and without contrast  · IV fluids tonight as she had IV contrast with CT  · Will send a UDS  · SCDs for DVT prophylaxis  · Discussed with  at bedside      Estimated LOS: pending clinical results     Signed:  Hector Gonzales MD [Soft] : soft [Non-tender] : non-tender [Non-distended] : non-distended [No HSM] : No HSM [No Lesions] : no lesions [No Mass] : no mass [Oriented x3] : oriented x3 [Labia Majora Erythema] : erythema [Labia Minora] : normal [Pink Rugae] : pink rugae [Discharge] : a  ~M vaginal discharge was present [Moderate] : moderate [White] : white [Cheesy] : cheesy [No Bleeding] : There was no active vaginal bleeding [Normal] : normal [Uterine Adnexae] : normal [FreeTextEntry1] : slight erythema

## 2022-03-18 PROBLEM — F41.9 ANXIETY: Status: ACTIVE | Noted: 2018-03-18

## 2022-03-19 PROBLEM — R22.1 NECK MASS: Status: ACTIVE | Noted: 2018-03-18

## 2022-03-19 PROBLEM — G93.40 ENCEPHALOPATHY ACUTE: Status: ACTIVE | Noted: 2018-03-18

## 2022-03-19 PROBLEM — F06.8: Status: ACTIVE | Noted: 2018-03-18

## 2022-03-20 PROBLEM — E87.6 HYPOKALEMIA: Status: ACTIVE | Noted: 2018-03-18
